# Patient Record
Sex: FEMALE | Race: WHITE | Employment: FULL TIME | URBAN - METROPOLITAN AREA
[De-identification: names, ages, dates, MRNs, and addresses within clinical notes are randomized per-mention and may not be internally consistent; named-entity substitution may affect disease eponyms.]

---

## 2018-05-04 ENCOUNTER — HOSPITAL ENCOUNTER (EMERGENCY)
Age: 23
Discharge: HOME | End: 2018-05-04
Attending: EMERGENCY MEDICINE
Payer: COMMERCIAL

## 2018-05-04 VITALS
HEART RATE: 90 BPM | TEMPERATURE: 99.9 F | OXYGEN SATURATION: 99 % | SYSTOLIC BLOOD PRESSURE: 95 MMHG | WEIGHT: 235 LBS | RESPIRATION RATE: 18 BRPM | DIASTOLIC BLOOD PRESSURE: 58 MMHG | HEIGHT: 65 IN | BODY MASS INDEX: 39.15 KG/M2

## 2018-05-04 DIAGNOSIS — N88.9 ABNORMAL APPEARANCE OF CERVIX: ICD-10-CM

## 2018-05-04 DIAGNOSIS — N39.0 URINARY TRACT INFECTION WITH HEMATURIA, SITE UNSPECIFIED: Primary | ICD-10-CM

## 2018-05-04 DIAGNOSIS — R31.9 URINARY TRACT INFECTION WITH HEMATURIA, SITE UNSPECIFIED: Primary | ICD-10-CM

## 2018-05-04 DIAGNOSIS — N93.9 VAGINAL BLEEDING: ICD-10-CM

## 2018-05-04 DIAGNOSIS — R52 BODY ACHES: ICD-10-CM

## 2018-05-04 DIAGNOSIS — R50.9 FEVER, UNSPECIFIED FEVER CAUSE: ICD-10-CM

## 2018-05-04 LAB
ALBUMIN SERPL-MCNC: 3.8 G/DL (ref 3.5–5.2)
ALP BLD-CCNC: 59 U/L (ref 35–104)
ALT SERPL-CCNC: 22 U/L (ref 0–32)
ANION GAP SERPL CALCULATED.3IONS-SCNC: 12 MMOL/L (ref 7–16)
AST SERPL-CCNC: 20 U/L (ref 0–31)
ATYPICAL LYMPHOCYTE RELATIVE PERCENT: 1.7 % (ref 0–4)
BACTERIA: ABNORMAL /HPF
BASOPHILS ABSOLUTE: 0 E9/L (ref 0–0.2)
BASOPHILS RELATIVE PERCENT: 0 % (ref 0–2)
BILIRUB SERPL-MCNC: 0.3 MG/DL (ref 0–1.2)
BILIRUBIN URINE: NEGATIVE
BLOOD, URINE: ABNORMAL
BUN BLDV-MCNC: 9 MG/DL (ref 6–20)
CALCIUM SERPL-MCNC: 8.8 MG/DL (ref 8.6–10.2)
CHLORIDE BLD-SCNC: 98 MMOL/L (ref 98–107)
CHP ED QC CHECK: NORMAL
CLARITY: CLEAR
CLUE CELLS: NORMAL
CO2: 25 MMOL/L (ref 22–29)
COLOR: ABNORMAL
CREAT SERPL-MCNC: 0.8 MG/DL (ref 0.5–1)
EOSINOPHILS ABSOLUTE: 0 E9/L (ref 0.05–0.5)
EOSINOPHILS RELATIVE PERCENT: 0 % (ref 0–6)
GFR AFRICAN AMERICAN: >60
GFR NON-AFRICAN AMERICAN: >60 ML/MIN/1.73
GLUCOSE BLD-MCNC: 95 MG/DL (ref 74–109)
GLUCOSE URINE: NEGATIVE MG/DL
HCT VFR BLD CALC: 41.5 % (ref 34–48)
HEMOGLOBIN: 13.7 G/DL (ref 11.5–15.5)
INFLUENZA A BY PCR: NOT DETECTED
INFLUENZA B BY PCR: NOT DETECTED
KETONES, URINE: NEGATIVE MG/DL
LEUKOCYTE ESTERASE, URINE: ABNORMAL
LYMPHOCYTES ABSOLUTE: 1.79 E9/L (ref 1.5–4)
LYMPHOCYTES RELATIVE PERCENT: 24.4 % (ref 20–42)
MCH RBC QN AUTO: 28.8 PG (ref 26–35)
MCHC RBC AUTO-ENTMCNC: 33 % (ref 32–34.5)
MCV RBC AUTO: 87.4 FL (ref 80–99.9)
MONO TEST: NEGATIVE
MONOCYTES ABSOLUTE: 0.62 E9/L (ref 0.1–0.95)
MONOCYTES RELATIVE PERCENT: 8.7 % (ref 2–12)
MUCUS: PRESENT
NEUTROPHILS ABSOLUTE: 4.49 E9/L (ref 1.8–7.3)
NEUTROPHILS RELATIVE PERCENT: 65.2 % (ref 43–80)
NITRITE, URINE: NEGATIVE
NUCLEATED RED BLOOD CELLS: 0 /100 WBC
PDW BLD-RTO: 13.2 FL (ref 11.5–15)
PH UA: 6 (ref 5–9)
PLATELET # BLD: 208 E9/L (ref 130–450)
PMV BLD AUTO: 10.3 FL (ref 7–12)
POTASSIUM SERPL-SCNC: 4.2 MMOL/L (ref 3.5–5)
PREGNANCY TEST URINE, POC: NEGATIVE
PROTEIN UA: 100 MG/DL
RBC # BLD: 4.75 E12/L (ref 3.5–5.5)
RBC # BLD: NORMAL 10*6/UL
RBC UA: ABNORMAL /HPF (ref 0–2)
SODIUM BLD-SCNC: 135 MMOL/L (ref 132–146)
SOURCE WET PREP: NORMAL
SPECIFIC GRAVITY UA: 1.02 (ref 1–1.03)
STREP GRP A PCR: NEGATIVE
TOTAL PROTEIN: 6.9 G/DL (ref 6.4–8.3)
TRICHOMONAS PREP: NORMAL
UROBILINOGEN, URINE: 0.2 E.U./DL
WBC # BLD: 6.9 E9/L (ref 4.5–11.5)
WBC UA: ABNORMAL /HPF (ref 0–5)
YEAST WET PREP: NORMAL

## 2018-05-04 PROCEDURE — 96374 THER/PROPH/DIAG INJ IV PUSH: CPT

## 2018-05-04 PROCEDURE — 81001 URINALYSIS AUTO W/SCOPE: CPT

## 2018-05-04 PROCEDURE — 87210 SMEAR WET MOUNT SALINE/INK: CPT

## 2018-05-04 PROCEDURE — 6370000000 HC RX 637 (ALT 250 FOR IP): Performed by: PHYSICIAN ASSISTANT

## 2018-05-04 PROCEDURE — 87491 CHLMYD TRACH DNA AMP PROBE: CPT

## 2018-05-04 PROCEDURE — 87591 N.GONORRHOEAE DNA AMP PROB: CPT

## 2018-05-04 PROCEDURE — 87502 INFLUENZA DNA AMP PROBE: CPT

## 2018-05-04 PROCEDURE — 87880 STREP A ASSAY W/OPTIC: CPT

## 2018-05-04 PROCEDURE — 86308 HETEROPHILE ANTIBODY SCREEN: CPT

## 2018-05-04 PROCEDURE — 6360000002 HC RX W HCPCS: Performed by: PHYSICIAN ASSISTANT

## 2018-05-04 PROCEDURE — 87088 URINE BACTERIA CULTURE: CPT

## 2018-05-04 PROCEDURE — 99284 EMERGENCY DEPT VISIT MOD MDM: CPT

## 2018-05-04 PROCEDURE — 80053 COMPREHEN METABOLIC PANEL: CPT

## 2018-05-04 PROCEDURE — 2580000003 HC RX 258: Performed by: PHYSICIAN ASSISTANT

## 2018-05-04 PROCEDURE — 85025 COMPLETE CBC W/AUTO DIFF WBC: CPT

## 2018-05-04 RX ORDER — ACETAMINOPHEN 500 MG
1000 TABLET ORAL ONCE
Status: COMPLETED | OUTPATIENT
Start: 2018-05-04 | End: 2018-05-04

## 2018-05-04 RX ORDER — ONDANSETRON 2 MG/ML
4 INJECTION INTRAMUSCULAR; INTRAVENOUS ONCE
Status: COMPLETED | OUTPATIENT
Start: 2018-05-04 | End: 2018-05-04

## 2018-05-04 RX ORDER — 0.9 % SODIUM CHLORIDE 0.9 %
1000 INTRAVENOUS SOLUTION INTRAVENOUS ONCE
Status: COMPLETED | OUTPATIENT
Start: 2018-05-04 | End: 2018-05-04

## 2018-05-04 RX ORDER — CEPHALEXIN 500 MG/1
500 CAPSULE ORAL 3 TIMES DAILY
Qty: 21 CAPSULE | Refills: 0 | Status: SHIPPED | OUTPATIENT
Start: 2018-05-04 | End: 2018-05-11

## 2018-05-04 RX ADMIN — ACETAMINOPHEN 1000 MG: 500 TABLET ORAL at 10:44

## 2018-05-04 RX ADMIN — SODIUM CHLORIDE 1000 ML: 9 INJECTION, SOLUTION INTRAVENOUS at 10:46

## 2018-05-04 RX ADMIN — ONDANSETRON 4 MG: 2 SOLUTION INTRAMUSCULAR; INTRAVENOUS at 10:46

## 2018-05-04 ASSESSMENT — PAIN DESCRIPTION - PAIN TYPE: TYPE: ACUTE PAIN

## 2018-05-04 ASSESSMENT — PAIN SCALES - GENERAL
PAINLEVEL_OUTOF10: 8
PAINLEVEL_OUTOF10: 0
PAINLEVEL_OUTOF10: 8

## 2018-05-04 ASSESSMENT — PAIN DESCRIPTION - PROGRESSION: CLINICAL_PROGRESSION: NOT CHANGED

## 2018-05-04 ASSESSMENT — PAIN DESCRIPTION - ONSET: ONSET: ON-GOING

## 2018-05-04 ASSESSMENT — PAIN DESCRIPTION - FREQUENCY: FREQUENCY: CONTINUOUS

## 2018-05-04 ASSESSMENT — PAIN DESCRIPTION - LOCATION: LOCATION: GENERALIZED;BACK

## 2018-05-04 ASSESSMENT — PAIN DESCRIPTION - DESCRIPTORS: DESCRIPTORS: CONSTANT;DISCOMFORT;SORE

## 2018-05-06 LAB — URINE CULTURE, ROUTINE: NORMAL

## 2018-05-08 LAB
CHLAMYDIA TRACHOMATIS AMPLIFIED DET: NORMAL
N GONORRHOEAE AMPLIFIED DET: NORMAL

## 2018-09-23 ENCOUNTER — APPOINTMENT (OUTPATIENT)
Dept: GENERAL RADIOLOGY | Age: 23
End: 2018-09-23
Payer: COMMERCIAL

## 2018-09-23 ENCOUNTER — HOSPITAL ENCOUNTER (EMERGENCY)
Age: 23
Discharge: HOME | End: 2018-09-23
Attending: EMERGENCY MEDICINE
Payer: COMMERCIAL

## 2018-09-23 VITALS
HEART RATE: 100 BPM | RESPIRATION RATE: 18 BRPM | DIASTOLIC BLOOD PRESSURE: 66 MMHG | HEIGHT: 65 IN | BODY MASS INDEX: 37.49 KG/M2 | OXYGEN SATURATION: 96 % | SYSTOLIC BLOOD PRESSURE: 109 MMHG | TEMPERATURE: 99.4 F | WEIGHT: 225 LBS

## 2018-09-23 DIAGNOSIS — R50.9 FEVER, UNSPECIFIED FEVER CAUSE: ICD-10-CM

## 2018-09-23 DIAGNOSIS — J06.9 ACUTE UPPER RESPIRATORY INFECTION: Primary | ICD-10-CM

## 2018-09-23 LAB — STREP GRP A PCR: NEGATIVE

## 2018-09-23 PROCEDURE — 99283 EMERGENCY DEPT VISIT LOW MDM: CPT

## 2018-09-23 PROCEDURE — 71046 X-RAY EXAM CHEST 2 VIEWS: CPT

## 2018-09-23 PROCEDURE — 6370000000 HC RX 637 (ALT 250 FOR IP): Performed by: EMERGENCY MEDICINE

## 2018-09-23 PROCEDURE — 87880 STREP A ASSAY W/OPTIC: CPT

## 2018-09-23 RX ORDER — ACETAMINOPHEN 325 MG/1
650 TABLET ORAL ONCE
Status: COMPLETED | OUTPATIENT
Start: 2018-09-23 | End: 2018-09-23

## 2018-09-23 RX ADMIN — ACETAMINOPHEN 650 MG: 325 TABLET ORAL at 19:23

## 2018-09-23 ASSESSMENT — ENCOUNTER SYMPTOMS
BLOOD IN STOOL: 0
NAUSEA: 0
SORE THROAT: 1
RHINORRHEA: 0
SINUS PAIN: 0
SHORTNESS OF BREATH: 0
BACK PAIN: 0
SINUS PRESSURE: 0
VOMITING: 0
ABDOMINAL PAIN: 0
COUGH: 1

## 2018-09-23 NOTE — ED PROVIDER NOTES
And is a 68-year-old female complaining of upper respiratory infection-like symptoms since yesterday. Patient has a dry cough sore throat and fever. Patient states other members of the family have similar symptoms. Patient has not attempted to treat her symptoms with any medication. Patient denies any nausea vomiting diarrhea abdominal pain chest pain rash weakness headache vision changes. Patient did sore throat is painful to the pain is a 6 out of 10 on swallowing. .            Review of Systems   Constitutional: Positive for fever. Negative for chills. HENT: Positive for sore throat. Negative for congestion, rhinorrhea, sinus pain and sinus pressure. Respiratory: Positive for cough. Negative for shortness of breath. Cardiovascular: Negative for chest pain. Gastrointestinal: Negative for abdominal pain, blood in stool, nausea and vomiting. Genitourinary: Negative for dysuria and frequency. Musculoskeletal: Negative for back pain. Skin: Negative for rash. Neurological: Negative for weakness and headaches. All other systems reviewed and are negative. Physical Exam   Constitutional: She is oriented to person, place, and time. She appears well-developed and well-nourished. No distress. febrile   HENT:   Head: Normocephalic and atraumatic. Limits oropharynx with bilaterally enlarged tonsils no exudate present, no lymphadenopathy present. Eyes: Conjunctivae are normal.   Neck: Normal range of motion. Neck supple. Cardiovascular: Normal rate, regular rhythm and normal heart sounds. No murmur heard. Pulmonary/Chest: Effort normal and breath sounds normal. No respiratory distress. She has no wheezes. She has no rales. Abdominal: Soft. Bowel sounds are normal. There is no tenderness. There is no rebound and no guarding. Musculoskeletal: She exhibits no edema. Neurological: She is alert and oriented to person, place, and time. No cranial nerve deficit.  Coordination normal.   Skin: °C)   Resp 18   Ht 5' 5\" (1.651 m)   Wt 225 lb (102.1 kg)   SpO2 96%   BMI 37.44 kg/m²   Oxygen Saturation Interpretation: Normal      ------------------------------------------ PROGRESS NOTES ------------------------------------------  12:32 AM  I have spoken with the patient and discussed todays results, in addition to providing specific details for the plan of care and counseling regarding the diagnosis and prognosis. Their questions are answered at this time and they are agreeable with the plan. I discussed at length with them reasons for immediate return here for re evaluation. They will followup with their primary care physician by calling their office tomorrow. --------------------------------- ADDITIONAL PROVIDER NOTES ---------------------------------  At this time the patient is without objective evidence of an acute process requiring hospitalization or inpatient management. They have remained hemodynamically stable throughout their entire ED visit and are stable for discharge with outpatient follow-up. The plan has been discussed in detail and they are aware of the specific conditions for emergent return, as well as the importance of follow-up. Discharge Medication List as of 9/23/2018  7:51 PM          Diagnosis:  1. Acute upper respiratory infection    2. Fever, unspecified fever cause        Disposition:  Patient's disposition: Discharge to home  Patient's condition is stable.        Carson Cardoza DO  Resident  09/24/18 [id number]

## 2019-05-08 PROBLEM — Z22.39 CARRIER OF UREAPLASMA UREALYTICUM: Status: ACTIVE | Noted: 2019-05-08

## 2021-01-06 ENCOUNTER — HOSPITAL ENCOUNTER (EMERGENCY)
Age: 26
Discharge: HOME | End: 2021-01-06
Payer: COMMERCIAL

## 2021-01-06 VITALS
OXYGEN SATURATION: 98 % | RESPIRATION RATE: 16 BRPM | BODY MASS INDEX: 39.15 KG/M2 | TEMPERATURE: 98.3 F | HEART RATE: 112 BPM | HEIGHT: 65 IN | DIASTOLIC BLOOD PRESSURE: 74 MMHG | SYSTOLIC BLOOD PRESSURE: 125 MMHG | WEIGHT: 235 LBS

## 2021-01-06 DIAGNOSIS — J02.0 STREP PHARYNGITIS: Primary | ICD-10-CM

## 2021-01-06 LAB — STREP GRP A PCR: POSITIVE

## 2021-01-06 PROCEDURE — 99281 EMR DPT VST MAYX REQ PHY/QHP: CPT

## 2021-01-06 PROCEDURE — 87880 STREP A ASSAY W/OPTIC: CPT

## 2021-01-06 PROCEDURE — 6370000000 HC RX 637 (ALT 250 FOR IP): Performed by: PHYSICIAN ASSISTANT

## 2021-01-06 RX ORDER — AMOXICILLIN 500 MG/1
500 CAPSULE ORAL 3 TIMES DAILY
Qty: 30 CAPSULE | Refills: 0 | Status: SHIPPED | OUTPATIENT
Start: 2021-01-06 | End: 2021-01-16

## 2021-01-06 RX ORDER — IBUPROFEN 600 MG/1
600 TABLET ORAL ONCE
Status: COMPLETED | OUTPATIENT
Start: 2021-01-06 | End: 2021-01-06

## 2021-01-06 RX ADMIN — IBUPROFEN 600 MG: 600 TABLET, FILM COATED ORAL at 10:35

## 2021-01-06 ASSESSMENT — PAIN DESCRIPTION - LOCATION: LOCATION: THROAT

## 2021-01-06 ASSESSMENT — PAIN SCALES - GENERAL
PAINLEVEL_OUTOF10: 9
PAINLEVEL_OUTOF10: 9

## 2021-01-06 NOTE — ED NOTES
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       Jaclyn Logan RN  01/06/21 1006

## 2021-01-06 NOTE — ED PROVIDER NOTES
[facility]                                                                                                                                      Department of Emergency Medicine   ED  Provider Note  Admit Date/RoomTime: 1/6/2021  9:56 AM  ED Room: 29/29        HPI:  1/6/21,   Time: 10:06 AM EST         April Aditya Wolfe is a 30 y.o. female presenting to the ED for sore throat, fever, beginning 1 day ago. The complaint has been persistent, moderate in severity, and worsened by talking and swallowing. The patient states that she started feeling ill yesterday. She complains of sore throat that started on the left yesterday that is now bilateral.  She states that she has some body aches and this morning had a temp of 101 at home. She did not take anything for her fever. The patient is not aware of any known exposure to strep or COVID-19. She does work here at the hospital in housekeeping. The patient denies cough, shortness of breath, chest pain or vomiting. She is otherwise in good health        ROS:     Constitutional:  See HPI  HENT: See HPI  Eyes: Negative for pain, discharge and redness  Respiratory:  Negative for shortness of breath, cough and wheezing  Cardiovascular: Negative for CP, edema or palpitations  Gastrointestinal: Negative for nausea, vomiting, diarrhea and abdominal distention  Genitourinary: Negative for dysuria and frequency  Musculoskeletal: Negative for back pain and arthralgia  Skin: Negative for rash and wound  Neurological: Negative for weakness and headaches  Hematological: Negative for adenopathy    All other systems reviewed and are negative      -------------------------------- PAST HISTORY ----------------------------------  Past Medical History:  has no past medical history on file. Past Surgical History:  has a past surgical history that includes [place] tooth extraction (1/24/14). Social History:  reports that she has never smoked.  She has never used smokeless tobacco. She reports that she does not drink alcohol or use drugs. Family History: family history is not on file. The patients home medications have been reviewed. Allergies: Patient has no known allergies. --------------------------------- RESULTS ------------------------------------------  All laboratory and radiology results have been personally reviewed by myself   LABS:  Results for orders placed or performed during the hospital encounter of 01/06/21   Strep Screen Group A Throat    Specimen: Throat   Result Value Ref Range    Strep Grp A PCR POSITIVE Negative       RADIOLOGY:  Interpreted by Radiologist.  No orders to display       ----------------- NURSING NOTES AND VITALS REVIEWED ---------------   The nursing notes within the ED encounter and vital signs as below have been reviewed. /74   Pulse 112   Temp 98.3 °F (36.8 °C) (Oral)   Resp 16   Ht 5' 5\" (1.651 m)   Wt 235 lb (106.6 kg)   SpO2 98%   BMI 39.11 kg/m²   Oxygen Saturation Interpretation: Normal      --------------------------------PHYSICAL EXAM------------------------------------      Constitutional/General: Alert and oriented x3, well appearing, non toxic in NAD  Head: NC/AT  Eyes: PERRL, EOMI  Posterior pharynx with moderate erythema and bilateral tonsillar hypertrophy with exudates. Mouth: Oropharynx clear, handling secretions, no trismus  Neck: Supple, full ROM, no meningeal signs  Pulmonary: Lungs clear to auscultation bilaterally, no wheezes, rales, or rhonchi. Not in respiratory distress  Cardiovascular:  Regular rate and rhythm, no murmurs, gallops, or rubs. 2+ distal pulses  Extremities: Moves all extremities x 4. Warm and well perfused  Skin: warm and dry without rash  Neurologic: GCS 15,  Intact.   No focal deficits  Psych: Normal Affect      ------------------------ ED COURSE/MEDICAL DECISION MAKING----------------------  Medications   ibuprofen (ADVIL;MOTRIN) tablet 600 mg (600 mg Oral Given 1/6/21 1035)

## 2021-06-04 ENCOUNTER — HOSPITAL ENCOUNTER (EMERGENCY)
Age: 26
Discharge: HOME | End: 2021-06-04
Payer: COMMERCIAL

## 2021-06-04 ENCOUNTER — APPOINTMENT (OUTPATIENT)
Dept: CT IMAGING | Age: 26
End: 2021-06-04
Payer: COMMERCIAL

## 2021-06-04 VITALS
HEIGHT: 65 IN | DIASTOLIC BLOOD PRESSURE: 90 MMHG | OXYGEN SATURATION: 98 % | SYSTOLIC BLOOD PRESSURE: 142 MMHG | TEMPERATURE: 98 F | BODY MASS INDEX: 41.65 KG/M2 | RESPIRATION RATE: 16 BRPM | WEIGHT: 250 LBS | HEART RATE: 89 BPM

## 2021-06-04 DIAGNOSIS — S00.83XA CONTUSION OF FACE, INITIAL ENCOUNTER: Primary | ICD-10-CM
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
## 2021-06-04 PROCEDURE — 99283 EMERGENCY DEPT VISIT LOW MDM: CPT

## 2021-06-04 PROCEDURE — 70486 CT MAXILLOFACIAL W/O DYE: CPT

## 2021-06-04 PROCEDURE — 70450 CT HEAD/BRAIN W/O DYE: CPT

## 2021-06-04 RX ORDER — OXYCODONE HYDROCHLORIDE AND ACETAMINOPHEN 5; 325 MG/1; MG/1
1 TABLET ORAL ONCE
Status: DISCONTINUED | OUTPATIENT
Start: 2021-06-04 | End: 2021-06-04 | Stop reason: HOSPADM
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
## 2021-06-04 NOTE — LETTER
5 [facility] Emergency Department  [address] [place] [address]  Phone: [phone]               June 4, 2021    Patient: Meli Ansari   YOB: 1995   Date of Visit: 6/4/2021       To Whom It May Concern:    Debbie Singh was seen and treated in our emergency department on 6/4/2021. She may return to work on 06/07/2021.       Sincerely,       Jermaine Barillas PA-C         Signature:__________________________________

## 2023-01-06 ENCOUNTER — HOSPITAL ENCOUNTER (EMERGENCY)
Age: 28
Discharge: HOME | End: 2023-01-06
Attending: EMERGENCY MEDICINE
Payer: COMMERCIAL

## 2023-01-06 VITALS
RESPIRATION RATE: 16 BRPM | TEMPERATURE: 98.4 F | HEIGHT: 65 IN | HEART RATE: 82 BPM | BODY MASS INDEX: 39.49 KG/M2 | WEIGHT: 237 LBS | SYSTOLIC BLOOD PRESSURE: 115 MMHG | DIASTOLIC BLOOD PRESSURE: 79 MMHG | OXYGEN SATURATION: 98 %

## 2023-01-06 DIAGNOSIS — R10.13 ABDOMINAL PAIN, EPIGASTRIC: Primary | ICD-10-CM

## 2023-01-06 LAB
BACTERIA: ABNORMAL /HPF
BILIRUBIN URINE: NEGATIVE
BLOOD, URINE: NEGATIVE
CLARITY: CLEAR
COLOR: YELLOW
EPITHELIAL CELLS, UA: ABNORMAL /HPF
GLUCOSE URINE: NEGATIVE MG/DL
HCG(URINE) PREGNANCY TEST: NEGATIVE
KETONES, URINE: NEGATIVE MG/DL
LEUKOCYTE ESTERASE, URINE: ABNORMAL
NITRITE, URINE: NEGATIVE
PH UA: 5 (ref 5–9)
PROTEIN UA: NEGATIVE MG/DL
RBC UA: ABNORMAL /HPF (ref 0–2)
SPECIFIC GRAVITY UA: >=1.03 (ref 1–1.03)
UROBILINOGEN, URINE: 0.2 E.U./DL
WBC UA: ABNORMAL /HPF (ref 0–5)

## 2023-01-06 PROCEDURE — 6370000000 HC RX 637 (ALT 250 FOR IP)

## 2023-01-06 PROCEDURE — 99283 EMERGENCY DEPT VISIT LOW MDM: CPT

## 2023-01-06 PROCEDURE — 81025 URINE PREGNANCY TEST: CPT

## 2023-01-06 PROCEDURE — 81001 URINALYSIS AUTO W/SCOPE: CPT

## 2023-01-06 RX ORDER — MAGNESIUM HYDROXIDE/ALUMINUM HYDROXICE/SIMETHICONE 120; 1200; 1200 MG/30ML; MG/30ML; MG/30ML
SUSPENSION ORAL
Status: COMPLETED
Start: 2023-01-06 | End: 2023-01-06

## 2023-01-06 RX ORDER — ONDANSETRON 4 MG/1
4 TABLET, ORALLY DISINTEGRATING ORAL ONCE
Status: COMPLETED | OUTPATIENT
Start: 2023-01-06 | End: 2023-01-06

## 2023-01-06 RX ORDER — PANTOPRAZOLE SODIUM 20 MG/1
20 TABLET, DELAYED RELEASE ORAL DAILY
Qty: 30 TABLET | Refills: 0 | Status: SHIPPED | OUTPATIENT
Start: 2023-01-06

## 2023-01-06 RX ADMIN — ONDANSETRON 4 MG: 4 TABLET, ORALLY DISINTEGRATING ORAL at 09:23

## 2023-01-06 RX ADMIN — ALUMINUM HYDROXIDE, MAGNESIUM HYDROXIDE, AND SIMETHICONE: 200; 200; 20 SUSPENSION ORAL at 09:23

## 2023-01-06 ASSESSMENT — PAIN DESCRIPTION - DESCRIPTORS: DESCRIPTORS: BURNING

## 2023-01-06 ASSESSMENT — PAIN SCALES - GENERAL: PAINLEVEL_OUTOF10: 5

## 2023-01-06 ASSESSMENT — PAIN - FUNCTIONAL ASSESSMENT: PAIN_FUNCTIONAL_ASSESSMENT: 0-10

## 2023-01-06 ASSESSMENT — PAIN DESCRIPTION - PAIN TYPE: TYPE: ACUTE PAIN

## 2023-01-06 ASSESSMENT — PAIN DESCRIPTION - ORIENTATION: ORIENTATION: MID;UPPER

## 2023-01-06 ASSESSMENT — PAIN DESCRIPTION - LOCATION: LOCATION: ABDOMEN

## 2023-01-06 NOTE — ED PROVIDER NOTES
ATTENDING PROVIDER ATTESTATION:     Dwight Yoder presented to the emergency department for evaluation of Abdominal Pain (Pt with epigastric pain and bloating for over a month)   and was initially evaluated by the Medical Resident. See Original ED Note for H&P and ED course above. I have reviewed and discussed the case, including pertinent history (medical, surgical, family and social) and exam findings with the Medical Resident assigned to Meli Knox. I have personally performed and/or participated in the history, exam, medical decision making, and procedures and agree with all pertinent clinical information. I, Dr. Maranda Robles MD, am the primary provider of this record. I have reviewed my findings and recommendations with the assigned Medical Resident, April Becky Toth and members of family present at the time of disposition. My findings/plan: The encounter diagnosis was Abdominal pain, epigastric. Maranda Robles MD          [address] [place] [address]        Pt Name: Dwight Yoder  MRN: [id number]  Birthdate 1995  Date of evaluation: 1/6/2023  Provider: Maranda Robles MD  PCP: Jake Menjivar DO  Note Started: 9:11 AM EST 1/6/23    CHIEF COMPLAINT       Chief Complaint   Patient presents with    Abdominal Pain     Pt with epigastric pain and bloating for over a month       HISTORY OF PRESENT ILLNESS: 1 or more Elements   History From: Patient     Limitations to history : None    Meli Toth is a 32 y.o. female who presents abdominal pain. Patient states that the pain is worse in the epigastric region but bloating for over a month. Associated with intermittent diarrhea and constipation. But states that diarrhea has been persistent. Denies any blood in diarrhea. Describes symptoms moderate in severity with no alleviating or exacerbating factors.   Denies any fever, chills, n/v, headache, dizziness, vision changes, neck tenderness or stiffness, weakness, cp, palpitations, leg swelling/tenderness, sob, cough, dysuria, hematuria, diarrhea, constipation. Nursing Notes were all reviewed and agreed with or any disagreements were addressed in the HPI.    ROS:   Pertinent positives and negatives are stated within HPI, all other systems reviewed and are negative.    --------------------------------------------- PAST HISTORY ---------------------------------------------  Past Medical History:  has no past medical history on file. Past Surgical History:  has a past surgical history that includes [place] tooth extraction (1/24/14). Social History:  reports that she has never smoked. She has never used smokeless tobacco. She reports that she does not drink alcohol and does not use drugs. Family History: family history is not on file. The patients home medications have been reviewed. Allergies: Patient has no known allergies. ---------------------------------------------------PHYSICAL EXAM--------------------------------------      Constitutional/General: Alert and oriented x3, well appearing, non toxic in NAD  Head: Normocephalic and atraumatic  Mouth: Oropharynx clear, handling secretions, no trismus  Neck: Supple, full ROM,  Pulmonary: Lungs clear to auscultation bilaterally, no wheezes, rales, or rhonchi. Not in respiratory distress  Cardiovascular:  Regular rate. Regular rhythm. No murmurs  Chest: no chest wall tenderness  Abdomen: Mild epigastric tenderness but soft, non distended. No rebound, guarding, or rigidity. No pulsatile masses appreciated. Musculoskeletal: Moves all extremities x 4. Warm and well perfused, no clubbing, cyanosis, or edema. Compartments are soft and compressible. Capillary refill <3 seconds. Skin: warm and dry. No rashes.    Neurologic: GCS 15, no gross focal neurologic deficits  Psych: Normal Affect    -------------------------------------------------- RESULTS -------------------------------------------------  I have personally reviewed all laboratory and imaging results for this patient. Results are listed below. LABS:  Results for orders placed or performed during the hospital encounter of 01/06/23   Urinalysis with Microscopic   Result Value Ref Range    Color, UA Yellow Straw/Yellow    Clarity, UA Clear Clear    Glucose, Ur Negative Negative mg/dL    Bilirubin Urine Negative Negative    Ketones, Urine Negative Negative mg/dL    Specific Gravity, UA >=1.030 1.005 - 1.030    Blood, Urine Negative Negative    pH, UA 5.0 5.0 - 9.0    Protein, UA Negative Negative mg/dL    Urobilinogen, Urine 0.2 <2.0 E.U./dL    Nitrite, Urine Negative Negative    Leukocyte Esterase, Urine TRACE (A) Negative    WBC, UA 1-3 0 - 5 /HPF    RBC, UA 0-1 0 - 2 /HPF    Epithelial Cells, UA RARE /HPF    Bacteria, UA NONE SEEN None Seen /HPF   Pregnancy, Urine   Result Value Ref Range    HCG(Urine) Pregnancy Test NEGATIVE NEGATIVE     ------------------------- NURSING NOTES AND VITALS REVIEWED ---------------------------   The nursing notes within the ED encounter and vital signs as below have been reviewed by myself and ED attending.   /79   Pulse 82   Temp 98.4 °F (36.9 °C) (Oral)   Resp 16   Ht 5' 5\" (1.651 m)   Wt 237 lb (107.5 kg)   SpO2 98%   BMI 39.44 kg/m²   Oxygen Saturation Interpretation: Normal    The patients available past medical records and past encounters were reviewed by myself and ED attending      ------------------------------ ED COURSE/MEDICAL DECISION MAKING----------------------    Medical Decision Making/Differential Diagnosis:    CC/HPI Summary, Pertinent Physical Exam Findings, Social Determinants of health, Records Reviewed, DDx, testing done/not done, ED Course, Reassessment, disposition considerations/shared decision making with patient, consults, disposition:      Medical Decision Making/ED COURSE:    History From: Patient    Limitations to history : None    Chronic Conditions affecting care: None   has no past medical history on file. Records Reviewed( Source) none       Meli Nuñez is a 32 y.o. female who presents to the ED for intermittent abdominal pain. Vital signs upon arrival /79   Pulse 82   Temp 98.4 °F (36.9 °C) (Oral)   Resp 16   Ht 5' 5\" (1.651 m)   Wt 237 lb (107.5 kg)   SpO2 98%   BMI 39.44 kg/m²  While in the ED patient was hemodynamically stable, afebrile, nontoxic-appearing, in no respiratory distress. Physical exam remarkable for upper abdomen tenderness, soft, nondistended, positive bowel sounds. Urinalysis negative for UTI or pregnancy. Patient administered Zofran orally 4 mg and GI cocktail orally. On reevaluation patient had significant symptomatic relief and abdominal Exam was improved. Patient feels comfortable going home and will follow up outpatient with PCP. Patient understands to return to the ED in case of worsening symptoms. ED Course as of 01/06/23 1650   Fri Jan 06, 2023   7911 Patient is a 18-year-old female presenting with intermittent upper abdominal pain for the last month or so. She states she followed up with her PCP who told her that she was constipated. She states has been taking as needed medications but nothing regularly. She is not taking a PPI or Pepcid. She describes it as pressure in her epigastrium and left upper quadrant, nonradiating. She states she does take frequent NSAIDs due to frequent headaches. She states she will frequently take them on an empty stomach. She denies any fevers, black or bloody stools, emesis, hematemesis, vaginal discharge, vaginal bleeding, or dysuria. Patient states that she followed up with her doctor. Initially she told the resident physician she had a CT scan, but on my evaluation, she states she believes it was an x-ray. On examination, the patient was hemodynamically stable and afebrile.   She had epigastric and left upper quadrant tenderness but no rebound or guarding. She had no lower abdominal tenderness. She had no focal right lower quadrant or periumbilical tenderness. Acute appendicitis is not suspected. I discussed with the patient that clinically I am concerned for gastritis versus GERD, especially given patient's frequent NSAID use. Patient has no focal right upper quadrant tenderness. Acute cholecystitis is not suspected. Discussed obtaining blood work though I discussed with the patient that I have low suspicion for acute abnormalities on blood work. Through shared decision-making, patient would like to defer blood work. Plan to treat with PPI and outpatient follow-up. Supportive care measures and ED return precautions discussed [JA]      ED Course User Index  [JA] Kailyn Johnson MD       Medications   ondansetron (ZOFRAN-ODT) disintegrating tablet 4 mg (4 mg Oral Given 1/6/23 0923)   aluminum & magnesium hydroxide-simethicone (MAALOX) 200-200-20 MG/5ML suspension (  Given 1/6/23 0923)       Discharge Medication List as of 1/6/2023  9:56 AM        START taking these medications    Details   pantoprazole (PROTONIX) 20 MG tablet Take 1 tablet by mouth daily, Disp-30 tablet, R-0Print             Discussion with Other Profesionals : None    Social Determinants : None    Records Reviewed : None    Chronic conditions: none    CONSULTS: none      Disposition:   Appropriate for outpatient management      Pt will be d/c with prescriptions for Protonix and will follow up with her PCP and GI. She is educated on signs and symptoms that require emergent evaluation. Pt is advised to return to the ED if her symptoms change or worsen. If her pain persists, pt may need further evaluation. Pt is agreeable to plan and all questions have been answered at this time.         This patient's ED course included: History, physical examination, reevaluation prior to disposition    This patient has remained hemodynamically stable during their ED course. Counseling: The emergency provider has spoken with the patient and discussed todays results, in addition to providing specific details for the plan of care and counseling regarding the diagnosis and prognosis. Questions are answered at this time and they are agreeable with the plan.       --------------------------------- IMPRESSION AND DISPOSITION ---------------------------------    IMPRESSION  1. Abdominal pain, epigastric        DISPOSITION  Disposition: Discharge to home  Patient condition is stable        NOTE: This report was transcribed using voice recognition software.  Every effort was made to ensure accuracy; however, inadvertent computerized transcription errors may be present        Lazaro Turner MD  Resident  01/06/23 8827       Saeed Mauricio MD  01/06/23 8524

## 2024-01-19 ENCOUNTER — HOSPITAL ENCOUNTER (EMERGENCY)
Age: 29
Discharge: HOME | End: 2024-01-19
Attending: EMERGENCY MEDICINE
Payer: COMMERCIAL

## 2024-01-19 VITALS
OXYGEN SATURATION: 94 % | HEIGHT: 65 IN | WEIGHT: 235 LBS | TEMPERATURE: 98 F | SYSTOLIC BLOOD PRESSURE: 142 MMHG | RESPIRATION RATE: 18 BRPM | DIASTOLIC BLOOD PRESSURE: 98 MMHG | HEART RATE: 93 BPM | BODY MASS INDEX: 39.15 KG/M2

## 2024-01-19 DIAGNOSIS — M62.838 TRAPEZIUS MUSCLE SPASM: Primary | ICD-10-CM

## 2024-01-19 PROCEDURE — 6360000002 HC RX W HCPCS: Performed by: EMERGENCY MEDICINE

## 2024-01-19 PROCEDURE — 96372 THER/PROPH/DIAG INJ SC/IM: CPT

## 2024-01-19 PROCEDURE — 99284 EMERGENCY DEPT VISIT MOD MDM: CPT

## 2024-01-19 PROCEDURE — 6370000000 HC RX 637 (ALT 250 FOR IP): Performed by: EMERGENCY MEDICINE

## 2024-01-19 RX ORDER — LIDOCAINE 4 G/G
1 PATCH TOPICAL ONCE
Status: DISCONTINUED | OUTPATIENT
Start: 2024-01-19 | End: 2024-01-19 | Stop reason: HOSPADM

## 2024-01-19 RX ORDER — LIDOCAINE 50 MG/G
1 PATCH TOPICAL DAILY
Qty: 10 PATCH | Refills: 0 | Status: SHIPPED | OUTPATIENT
Start: 2024-01-19 | End: 2024-01-29

## 2024-01-19 RX ORDER — LIDOCAINE 4 G/G
1 PATCH TOPICAL DAILY
Status: DISCONTINUED | OUTPATIENT
Start: 2024-01-19 | End: 2024-01-19

## 2024-01-19 RX ORDER — KETOROLAC TROMETHAMINE 10 MG/1
10 TABLET, FILM COATED ORAL EVERY 8 HOURS PRN
Qty: 12 TABLET | Refills: 0 | Status: SHIPPED | OUTPATIENT
Start: 2024-01-19

## 2024-01-19 RX ORDER — ACETAMINOPHEN 325 MG/1
650 TABLET ORAL ONCE
Status: COMPLETED | OUTPATIENT
Start: 2024-01-19 | End: 2024-01-19

## 2024-01-19 RX ORDER — KETOROLAC TROMETHAMINE 30 MG/ML
15 INJECTION, SOLUTION INTRAMUSCULAR; INTRAVENOUS ONCE
Status: COMPLETED | OUTPATIENT
Start: 2024-01-19 | End: 2024-01-19

## 2024-01-19 RX ADMIN — ACETAMINOPHEN 650 MG: 325 TABLET ORAL at 05:14

## 2024-01-19 RX ADMIN — KETOROLAC TROMETHAMINE 15 MG: 30 INJECTION INTRAMUSCULAR; INTRAVENOUS at 05:15

## 2024-01-19 ASSESSMENT — LIFESTYLE VARIABLES
HOW OFTEN DO YOU HAVE A DRINK CONTAINING ALCOHOL: MONTHLY OR LESS
HOW MANY STANDARD DRINKS CONTAINING ALCOHOL DO YOU HAVE ON A TYPICAL DAY: 3 OR 4

## 2024-01-19 ASSESSMENT — ENCOUNTER SYMPTOMS
DIARRHEA: 0
SORE THROAT: 0
ABDOMINAL DISTENTION: 0
NAUSEA: 0
EYE REDNESS: 0
VOMITING: 0
COUGH: 0
BACK PAIN: 0
WHEEZING: 0
SHORTNESS OF BREATH: 0
EYE PAIN: 0
SINUS PRESSURE: 0
EYE DISCHARGE: 0

## 2024-01-19 ASSESSMENT — PAIN SCALES - GENERAL
PAINLEVEL_OUTOF10: 8
PAINLEVEL_OUTOF10: 3

## 2024-01-19 ASSESSMENT — PAIN - FUNCTIONAL ASSESSMENT: PAIN_FUNCTIONAL_ASSESSMENT: 0-10

## 2024-01-19 NOTE — ED PROVIDER NOTES
hemodynamically stable throughout their entire ED visit and are stable for discharge with outpatient follow-up.     The plan has been discussed in detail and they are aware of the specific conditions for emergent return, as well as the importance of follow-up.      New Prescriptions    KETOROLAC (TORADOL) 10 MG TABLET    Take 1 tablet by mouth every 8 hours as needed for Pain    LIDOCAINE (LIDODERM) 5 %    Place 1 patch onto the skin daily for 10 days 12 hours on, 12 hours off.       Diagnosis:  1. Trapezius muscle spasm        Disposition:  Patient's disposition: Discharge to home  Patient's condition is stable.       Elías Morgan DO  01/19/24 0659